# Patient Record
Sex: MALE | Race: WHITE
[De-identification: names, ages, dates, MRNs, and addresses within clinical notes are randomized per-mention and may not be internally consistent; named-entity substitution may affect disease eponyms.]

---

## 2022-08-11 ENCOUNTER — HOSPITAL ENCOUNTER (INPATIENT)
Dept: HOSPITAL 97 - ER | Age: 82
LOS: 13 days | Discharge: NURSING, REHAB OR LONG-TERM CARE | DRG: 69 | End: 2022-08-24
Attending: HOSPITALIST | Admitting: HOSPITALIST
Payer: COMMERCIAL

## 2022-08-11 VITALS — BODY MASS INDEX: 27.3 KG/M2

## 2022-08-11 DIAGNOSIS — Z79.899: ICD-10-CM

## 2022-08-11 DIAGNOSIS — G72.9: ICD-10-CM

## 2022-08-11 DIAGNOSIS — E78.5: ICD-10-CM

## 2022-08-11 DIAGNOSIS — Z20.822: ICD-10-CM

## 2022-08-11 DIAGNOSIS — M62.82: ICD-10-CM

## 2022-08-11 DIAGNOSIS — G93.41: ICD-10-CM

## 2022-08-11 DIAGNOSIS — G45.9: Primary | ICD-10-CM

## 2022-08-11 DIAGNOSIS — G30.9: ICD-10-CM

## 2022-08-11 DIAGNOSIS — Z60.2: ICD-10-CM

## 2022-08-11 DIAGNOSIS — R53.1: ICD-10-CM

## 2022-08-11 DIAGNOSIS — I10: ICD-10-CM

## 2022-08-11 DIAGNOSIS — E88.89: ICD-10-CM

## 2022-08-11 DIAGNOSIS — F02.80: ICD-10-CM

## 2022-08-11 DIAGNOSIS — R09.02: ICD-10-CM
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
## 2022-08-11 LAB
ALBUMIN SERPL BCP-MCNC: 3.3 G/DL (ref 3.4–5)
ALP SERPL-CCNC: 78 U/L (ref 45–117)
ALT SERPL W P-5'-P-CCNC: 18 U/L (ref 12–78)
AST SERPL W P-5'-P-CCNC: 21 U/L (ref 15–37)
BLD SMEAR INTERP: (no result)
BUN BLD-MCNC: 14 MG/DL (ref 7–18)
GLUCOSE SERPLBLD-MCNC: 160 MG/DL (ref 74–106)
HCT VFR BLD CALC: 41.3 % (ref 39.6–49)
INR BLD: 1.16
LYMPHOCYTES # SPEC AUTO: 0.2 K/UL (ref 0.7–4.9)
MCV RBC: 85.9 FL (ref 80–100)
MORPHOLOGY BLD-IMP: (no result)
PMV BLD: 6.2 FL (ref 7.6–11.3)
POTASSIUM SERPL-SCNC: 3.5 MMOL/L (ref 3.5–5.1)
RBC # BLD: 4.81 M/UL (ref 4.33–5.43)

## 2022-08-11 PROCEDURE — 93306 TTE W/DOPPLER COMPLETE: CPT

## 2022-08-11 PROCEDURE — 84100 ASSAY OF PHOSPHORUS: CPT

## 2022-08-11 PROCEDURE — 85652 RBC SED RATE AUTOMATED: CPT

## 2022-08-11 PROCEDURE — 70551 MRI BRAIN STEM W/O DYE: CPT

## 2022-08-11 PROCEDURE — 85025 COMPLETE CBC W/AUTO DIFF WBC: CPT

## 2022-08-11 PROCEDURE — 83605 ASSAY OF LACTIC ACID: CPT

## 2022-08-11 PROCEDURE — 71045 X-RAY EXAM CHEST 1 VIEW: CPT

## 2022-08-11 PROCEDURE — 36415 COLL VENOUS BLD VENIPUNCTURE: CPT

## 2022-08-11 PROCEDURE — 96374 THER/PROPH/DIAG INJ IV PUSH: CPT

## 2022-08-11 PROCEDURE — 87040 BLOOD CULTURE FOR BACTERIA: CPT

## 2022-08-11 PROCEDURE — 83735 ASSAY OF MAGNESIUM: CPT

## 2022-08-11 PROCEDURE — 84443 ASSAY THYROID STIM HORMONE: CPT

## 2022-08-11 PROCEDURE — 80048 BASIC METABOLIC PNL TOTAL CA: CPT

## 2022-08-11 PROCEDURE — 96361 HYDRATE IV INFUSION ADD-ON: CPT

## 2022-08-11 PROCEDURE — 87804 INFLUENZA ASSAY W/OPTIC: CPT

## 2022-08-11 PROCEDURE — 97161 PT EVAL LOW COMPLEX 20 MIN: CPT

## 2022-08-11 PROCEDURE — 80061 LIPID PANEL: CPT

## 2022-08-11 PROCEDURE — 99285 EMERGENCY DEPT VISIT HI MDM: CPT

## 2022-08-11 PROCEDURE — 84132 ASSAY OF SERUM POTASSIUM: CPT

## 2022-08-11 PROCEDURE — 84436 ASSAY OF TOTAL THYROXINE: CPT

## 2022-08-11 PROCEDURE — 70450 CT HEAD/BRAIN W/O DYE: CPT

## 2022-08-11 PROCEDURE — 81015 MICROSCOPIC EXAM OF URINE: CPT

## 2022-08-11 PROCEDURE — 85610 PROTHROMBIN TIME: CPT

## 2022-08-11 PROCEDURE — 86480 TB TEST CELL IMMUN MEASURE: CPT

## 2022-08-11 PROCEDURE — 71260 CT THORAX DX C+: CPT

## 2022-08-11 PROCEDURE — 93005 ELECTROCARDIOGRAM TRACING: CPT

## 2022-08-11 PROCEDURE — 85730 THROMBOPLASTIN TIME PARTIAL: CPT

## 2022-08-11 PROCEDURE — 80053 COMPREHEN METABOLIC PANEL: CPT

## 2022-08-11 PROCEDURE — 82550 ASSAY OF CK (CPK): CPT

## 2022-08-11 PROCEDURE — 97116 GAIT TRAINING THERAPY: CPT

## 2022-08-11 PROCEDURE — 81003 URINALYSIS AUTO W/O SCOPE: CPT

## 2022-08-11 PROCEDURE — 72125 CT NECK SPINE W/O DYE: CPT

## 2022-08-11 RX ADMIN — Medication SCH ML: at 22:58

## 2022-08-11 RX ADMIN — ATORVASTATIN CALCIUM SCH MG: 40 TABLET, FILM COATED ORAL at 22:58

## 2022-08-11 RX ADMIN — ENOXAPARIN SODIUM SCH MG: 40 INJECTION SUBCUTANEOUS at 22:58

## 2022-08-11 RX ADMIN — CEFTRIAXONE SCH MLS: 1 INJECTION, POWDER, FOR SOLUTION INTRAMUSCULAR; INTRAVENOUS at 22:57

## 2022-08-11 SDOH — SOCIAL STABILITY - SOCIAL INSECURITY: PROBLEMS RELATED TO LIVING ALONE: Z60.2

## 2022-08-11 NOTE — ER
Nurse's Notes                                                                                     

 [facility]                                                                 

Name: Levon Rodrigez                                                                              

Age: 82 yrs                                                                                       

Sex: Male                                                                                         

: 1940                                                                                   

MRN: [id number]                                                                                   

Arrival Date: 2022                                                                          

Time: 12:32                                                                                       

Account#: [id number]                                                                            

Bed 6                                                                                             

Private MD:                                                                                       

Diagnosis: Muscle weakness (generalized);Ataxia, unspecified;Viral pneumonitis                    

                                                                                                  

Presentation:                                                                                     

                                                                                             

12:33 Chief complaint: EMS states: patient has been having flu like symptoms for three days   bm7 

      and about an hour ago the son found the patient down on the grown unrsesponsive.            

      Coronavirus screen: Client presents with at least one sign or symptom that may indicate     

      coronavirus-19. Standard/surgical mask placed on the client. Ebola Screen: No symptoms      

      or risks identified at this time. Initial Sepsis Screen: Does the patient meet any 2        

      criteria? HR > 90 bpm. Does the patient have a suspected source of infection? No.           

      Patient's initial sepsis screen is negative. Risk Assessment: Do you want to hurt           

      yourself or someone else? Patient reports no desire to harm self or others. Onset of        

      symptoms was 2022.                                                               

12:33 Method Of Arrival: EMS: [facility]                                                bm7 

12:33 Acuity: JUSTYN 2                                                                           bm7 

                                                                                                  

Triage Assessment:                                                                                

12:35 General: Appears in no apparent distress. comfortable, well groomed, well developed,    bm7 

      Behavior is calm, cooperative. Pain: Denies pain. EENT: No deficits noted. No signs         

      and/or symptoms were reported regarding the EENT system. Neuro: Level of Consciousness      

      is awake, alert, obeys commands, Oriented to person, place, time, situation, Speech         

      slow to respond . Facial symmetry appears normal, Pupils are PERRLA, Reports dizziness,     

      headache. Cardiovascular: Denies shortness of breath, Chest pain is denied.                 

      Respiratory: No deficits noted. Airway is patent Respiratory effort is even, unlabored,     

      Respiratory pattern is regular, symmetrical. GI: No deficits noted. No signs and/or         

      symptoms were reported involving the gastrointestinal system. : No deficits noted. No     

      signs and/or symptoms were reported regarding the genitourinary system. Derm: No            

      deficits noted. No signs and/or symptoms reported regarding the dermatologic system.        

      Musculoskeletal: No deficits noted. No signs and/or symptoms reported regarding the         

      musculoskeletal system.                                                                     

                                                                                                  

Historical:                                                                                       

- Allergies:                                                                                      

12:35 No Known Allergies;                                                                     ha1 

- Home Meds:                                                                                      

12:35 Unable to obtain [Active];                                                              bm7 

- PMHx:                                                                                           

12:35 Hypercholesterolemia; Hypertensive disorder; Dementia; Alzheimer's disease;             ha1 

- PSHx:                                                                                           

12:35 Unable to Obtain;                                                                       bm7 

                                                                                                  

- Immunization history:: Adult Immunizations unknown.                                             

- Social history:: Smoking status: unknown.                                                       

                                                                                                  

                                                                                                  

Screenin:32 Abuse screen: Denies threats or abuse. Nutritional screening: No deficits noted.        ha1 

      Tuberculosis screening: No symptoms or risk factors identified.                             

20:49 Fall Risk Fall in past 12 months (25 points). Secondary diagnosis (15 points) dementia, vc1 

      impaired mobility, IV access (20 points). Ambulatory Aid- Crutches/Cane/Walker (15          

      pts). Gait- Weak (10 pts.). Mental Status- Overestimates/Forgets Limitations (15 pts.).     

      Total Aparicio Fall Scale indicates High Risk Score (45 or more points). Fall prevention       

      measures have been instituted. Side Rails Up X 2 1:1 Attendant Assigned Frequent            

      Obs/Assessments Occuring As available patient and family educated on Fall Prevention        

      Program and Strategies.                                                                     

                                                                                                  

Assessment:                                                                                       

12:35 General: Appears comfortable, Behavior is calm, cooperative. Pain: Denies pain. Neuro:  ha1 

      Level of Consciousness is awake, alert, obeys commands, Oriented to person, place,          

      situation, pt. son's reports that his father was diagnosed with dementia a year ago.        

      pt. son's states " today he seems not like his normal when he walks, he seems weak"..       

12:35 Cardiovascular: Heart tones S1 S2 present Rhythm is sinus rhythm.                       ha1 

12:35 Respiratory: Reports that has had a chronic dry cough. Airway is patent Respiratory     ha1 

      effort is even, unlabored, Respiratory pattern is regular, symmetrical, Breath sounds       

      are clear. GI: No signs and/or symptoms were reported involving the gastrointestinal        

      system. Abdomen is flat, non-distended, Bowel sounds present X 4 quads. : No signs        

      and/or symptoms were reported regarding the genitourinary system. EENT: Reports the pt.     

      son's reports that his father is hard hearing and difficulty seeing long distance..         

      Derm: Skin is intact, Skin is pink, warm \T\ dry. Musculoskeletal: able to ambulate with    

      assistance of cane and assistance of pt.'s son. pt.'s son reports that he noticed a         

      decreased in the ability to walk without assistance.                                        

13:10 Reassessment: Patient and/or family updated on plan of care and expected duration. Pain ha1 

      level reassessed. Patient is alert, oriented x 3, equal unlabored respirations, skin        

      warm/dry/pink.                                                                              

14:50 Reassessment: provider at bedside at this time.                                         tw2 

16:15 Reassessment: Patient and/or family updated on plan of care and expected duration. Pain ha1 

      level reassessed. Patient is alert, oriented x 3, equal unlabored respirations, skin        

      warm/dry/pink.                                                                              

17:10 Reassessment: Patient and/or family updated on plan of care and expected duration. Pain ha1 

      level reassessed. Patient is alert, oriented x 3, equal unlabored respirations, skin        

      warm/dry/pink. pt. attempted to get out of bed and go for a walk outside his room.          

      explained to pt. that he needed to call before getting out bed. assisted pt. to walk        

      few steps inside pt.'s room. pt. back in bed, call bell within reach. room close to         

      nursing station.                                                                            

17:50 Reassessment: Patient and/or family updated on plan of care and expected duration. Pain ha1 

      level reassessed. Patient is alert, oriented x 3, equal unlabored respirations, skin        

      warm/dry/pink. pt. watching TV.                                                             

18:26 Reassessment: Patient and/or family updated on plan of care and expected duration. Pain ha1 

      level reassessed. Patient is alert, oriented x 3, equal unlabored respirations, skin        

      warm/dry/pink. pt.'s son at bedside.                                                        

20:33 Reassessment: Patient and/or family updated on plan of care and expected duration. Pain vc1 

      level reassessed. General: Appears comfortable, Behavior is restless. Neuro: Level of       

      Consciousness is confused, Oriented to person.                                              

                                                                                                  

Vital Signs:                                                                                      

12:33  / 82; Pulse 116; Resp 20; Temp 98.5(TE); Pulse Ox 97% on 2 lpm NC; Weight 81.65  bm7 

      kg (R); Height 5 ft. 8 in. (172.72 cm); Pain 0/10;                                          

14:00  / 81; Pulse 89; Resp 16; Pulse Ox 95% on 2 lpm NC;                               tw2 

14:58  / 86; Pulse 90; Resp 18 S; Pulse Ox 97% on R/A;                                  ha1 

15:30  / 72; Pulse 88; Resp 17 S; Pulse Ox 95% on R/A;                                  ha1 

16:17  / 74; Pulse 92; Resp 16 S; Pulse Ox 96% on R/A;                                  ha1 

17:05  / 72; Pulse 77; Resp 18 S; Pulse Ox 97% on R/A;                                  ha1 

17:49  / 74; Pulse 79; Resp 17; Pulse Ox 96% on R/A;                                    ha1 

18:25  / 76; Pulse 68; Resp 17 S; Pulse Ox 96% on R/A;                                  ha1 

19:00  / 69; Pulse 84; Resp 18; Pulse Ox 96% on R/A;                                    vc1 

20:42  / 81; Pulse 97; Resp 17; Pulse Ox 95% on R/A;                                    vc1 

12:33 Body Mass Index 27.37 (81.65 kg, 172.72 cm)                                             bm7 

                                                                                                  

ED Course:                                                                                        

12:32 Patient arrived in ED.                                                                  aa5 

12:32 Placed in gown. Bed in low position. Call light in reach. Side rails up X2. Adult w/    ha1 

      patient.                                                                                    

12:35 Triage completed.                                                                       bm7 

12:35 Arm band placed on right wrist.                                                         bm7 

12:36 Eric Sheldon DO is Attending Physician.                                                ms3 

12:37 Juanita Montague, YVONNE is Primary Nurse.                                                      ha1 

12:56 Chest Single View XRAY In Process Unspecified.                                          EDMS

12:57 Initial lab(s) drawn, by me, sent to lab. Inserted saline lock: 20 gauge in right       iw  

      forearm, using aseptic technique. Blood collected.                                          

13:14 CT Head C Spine In Process Unspecified.                                                 EDMS

16:33 Tommy Rosales MD is Hospitalizing Provider.                                           ms3 

19:15 Report given to YVONNE Malloy.                                                            ha1 

20:48 No provider procedures requiring assistance completed. Patient admitted, IV remains in  vc1 

      place.                                                                                      

                                                                                                  

Administered Medications:                                                                         

13:43 Drug: NS 0.9% 1000 ml Route: IV; Rate: 1000 ml; Site: right antecubital;                aa5 

15:45 Follow up: Response: No adverse reaction; IV Status: Completed infusion                 ha1 

20:36 Drug: HALdol (haloperidol) 5 mg Route: IVP; Site: right forearm;                        ke1 

                                                                                                  

                                                                                                  

Medication:                                                                                       

20:49 VIS not applicable for this client.                                                     1 

                                                                                                  

Outcome:                                                                                          

16:34 Decision to Hospitalize by Provider.                                                    ms3 

20:48 Admitted to Med/surg accompanied by tech, via wheelchair, room 202, Report called to    vc1 

      YVONNE Enriquez                                                                               

20:48 Condition: good                                                                             

20:48 Instructed on the need for admit, safety practices.                                         

21:16 Patient left the ED.                                                                    1 

                                                                                                  

Signatures:                                                                                       

Dispatcher [employer]                           EDMS                                                 

Iveth George, RN                     YVONNE                                                      

Merlyn Almeida RN                     YVONNE   aa5                                                  

Nohemy Nguyen RN                          YVONNE   tw2                                                  

Eric Sheldon, DO                        DO   ms3                                                  

Anel Valenzuela, RN                  RN   bm7                                                  

Laure Webb RN                    RN   1                                                  

Gama Hernandez RN RN   ke1                                                  

Juanita Montague RN                        RN   ha1                                                  

                                                                                                  

Corrections: (The following items were deleted from the chart)                                    

14:01 14:00  / 81; Pulse 89bpm; Resp 16bpm; Pulse Ox 95% RA; tw2                        tw2 

17:37 12:35 Neuro: Level of Consciousness is awake, alert, obeys commands, Oriented to        ha1 

      person, place, situation, pt. son's reports that his father was diagnosed with dementia     

      a year ago. pt. son's states " today he seems not like his normal when he walks, he         

      seems weak".. ha1                                                                           

17:53 12:35 Respiratory: Airway is patent Respiratory effort is even, unlabored, Respiratory  ha1 

      pattern is regular, symmetrical, Breath sounds are clear ha1                                

19:02 12:35 Allergies: Unable to obtain; bm7                                                  ha1 

                                                                                                  

**************************************************************************************************

## 2022-08-11 NOTE — RAD REPORT
EXAM DESCRIPTION:  RAD - Chest Single View - 8/11/2022 12:54 pm

 

CLINICAL HISTORY:  COUGH

Chest pain.

 

COMPARISON:  No comparisons

 

FINDINGS:  Portable technique limits examination quality.

 

Mildly prominent bilateral interstitial markings likely related to viral pneumonitis. The heart is no
rmal in size. No displaced fractures.

## 2022-08-11 NOTE — EDPHYS
Physician Documentation                                                                           

 [facility]                                                                 

Name: Levon Rodrigez                                                                              

Age: 82 yrs                                                                                       

Sex: Male                                                                                         

: 1940                                                                                   

MRN: [id number]                                                                                   

Arrival Date: 2022                                                                          

Time: 12:32                                                                                       

Account#: [id number]                                                                            

Bed 6                                                                                             

Private MD:                                                                                       

ED Physician Eric Sheldon                                                                         

HPI:                                                                                              

                                                                                             

12:37 This 82 yrs old Male presents to ER via EMS with complaints of Fall, Generalized        ms3 

      weakness, flu like symptoms.                                                                

12:37 Details of fall: The patient fell. Onset: The symptoms/episode began/occurred at an     ms3 

      unknown time. Patient found on the floor at 1130. Associated injuries: The patient          

      sustained no obvious injury. Severity of symptoms: At their worst the symptoms were in      

      the emergency department the symptoms a " 0" out of "10". Severity of symptoms:.            

      82-year-old male with past medical history of dementia presents [facility], EMS          

      status post being found on the ground at 11:30 AM. Patient is typically ambulatory with     

      a cane. EMS states on standing patient was too weak to stand. Patient's son stated to       

      EMS that patient has recently had flulike symptoms. Patient denies pain. HPI limited        

      due to patient's dementia..                                                                 

                                                                                                  

Historical:                                                                                       

- Allergies:                                                                                      

12:35 No Known Allergies;                                                                     ha1 

- Home Meds:                                                                                      

12:35 Unable to obtain [Active];                                                              bm7 

- PMHx:                                                                                           

12:35 Hypercholesterolemia; Hypertensive disorder; Dementia; Alzheimer's disease;             ha1 

- PSHx:                                                                                           

12:35 Unable to Obtain;                                                                       bm7 

                                                                                                  

- Immunization history:: Adult Immunizations unknown.                                             

- Social history:: Smoking status: unknown.                                                       

                                                                                                  

                                                                                                  

ROS:                                                                                              

12:37 Constitutional: Negative for fever, and chills. Cardiovascular: Negative for chest      ms3 

      pain, and palpitations. Respiratory: Negative for shortness of breath, cough, wheezing,     

      and pleuritic chest pain, Abdomen/GI: Negative for abdominal pain, nausea, vomiting,        

      diarrhea, and constipation, Skin: Negative for injury, rash, and discoloration, Neuro:      

      Negative for headache, weakness, numbness, tingling.                                        

12:37 All other systems are negative.                                                             

                                                                                                  

Exam:                                                                                             

12:37 Constitutional:  This is a well developed, well nourished patient who is awake, alert,  ms3 

      and in no acute distress. Head/Face:  Normocephalic, atraumatic. Neck:  Trachea             

      midline, no cervical lymphadenopathy.  Supple, full range of motion without nuchal          

      rigidity, or vertebral point tenderness.  No Meningismus. Chest/axilla:  Normal chest       

      wall appearance and motion.  Nontender with no deformity.   Respiratory:  Lungs have        

      equal breath sounds bilaterally, clear to auscultation and percussion.  No rales,           

      rhonchi or wheezes noted.  No increased work of breathing, no retractions or nasal          

      flaring. Abdomen/GI:  Soft, non-tender, with normal bowel sounds.  No distension or         

      tympany.  No guarding or rebound.  No evidence of tenderness throughout. Skin:  Warm,       

      dry with normal turgor.  Normal color with no rashes, no lesions, and no evidence of        

      cellulitis. MS/ Extremity:  Pulses equal, no cyanosis.  Neurovascular intact.  Full,        

      normal range of motion. Psych:  Awake, alert, with orientation to person, place and         

      time.  Behavior, mood, and affect are within normal limits.                                 

12:37 Cardiovascular: Rate: tachycardic, Rhythm: regular, Pulses: no pulse deficits are           

      appreciated, Heart sounds: normal, normal S1and S2.                                         

13:20 ECG was reviewed by the Attending Physician.                                            ms3 

                                                                                                  

Vital Signs:                                                                                      

12:33  / 82; Pulse 116; Resp 20; Temp 98.5(TE); Pulse Ox 97% on 2 lpm NC; Weight 81.65  bm7 

      kg (R); Height 5 ft. 8 in. (172.72 cm); Pain 0/10;                                          

14:00  / 81; Pulse 89; Resp 16; Pulse Ox 95% on 2 lpm NC;                               tw2 

14:58  / 86; Pulse 90; Resp 18 S; Pulse Ox 97% on R/A;                                  ha1 

15:30  / 72; Pulse 88; Resp 17 S; Pulse Ox 95% on R/A;                                  ha1 

16:17  / 74; Pulse 92; Resp 16 S; Pulse Ox 96% on R/A;                                  ha1 

17:05  / 72; Pulse 77; Resp 18 S; Pulse Ox 97% on R/A;                                  ha1 

17:49  / 74; Pulse 79; Resp 17; Pulse Ox 96% on R/A;                                    ha1 

18:25  / 76; Pulse 68; Resp 17 S; Pulse Ox 96% on R/A;                                  ha1 

19:00  / 69; Pulse 84; Resp 18; Pulse Ox 96% on R/A;                                    vc1 

20:42  / 81; Pulse 97; Resp 17; Pulse Ox 95% on R/A;                                    vc1 

12:33 Body Mass Index 27.37 (81.65 kg, 172.72 cm)                                             bm7 

                                                                                                  

MDM:                                                                                              

12:36 Patient medically screened.                                                             ms3 

12:37 Differential Diagnosis altered mental status, sepsis, flu.                              ms3 

16:34 Data reviewed: vital signs, nurses notes, lab test result(s), EKG, radiologic studies,  ms3 

      and as a result, I will admit patient. Counseling: I had a detailed discussion with the     

      patient and/or guardian regarding: the historical points, exam findings, and any            

      diagnostic results supporting the discharge/admit diagnosis, lab results, radiology         

      results, the need for further work-up and treatment in the hospital. ED course:             

      Discussed case with Dr Rosales and he accepts patient. Discussed plan for observation with     

      patient and his son and they agree with plan. All questions answered. Patients HR           

      improved after fluids..                                                                     

                                                                                                  

                                                                                             

12:42 Order name: Blood Culture Adult (2)                                                     ms3 

                                                                                             

12:42 Order name: CBC with Diff; Complete Time: 05:11                                         ms3 

                                                                                             

12:42 Order name: CMP; Complete Time: 13:33                                                   ms3 

                                                                                             

12:42 Order name: Lactate; Complete Time: 13:33                                               ms3 

                                                                                             

12:42 Order name: Protime (+inr); Complete Time: 13:58                                        ms3 

                                                                                             

12:42 Order name: Ptt, Activated; Complete Time: 13:58                                        ms3 

                                                                                             

12:42 Order name: Urine Microscopic Only; Complete Time: 15:51                                ms3 

                                                                                             

12:49 Order name: SARS-COV-2 RT PCR (Document "Date of Onset" if Symptomatic); Complete Time: iw  

      14:49                                                                                       

                                                                                             

12:49 Order name: Flu; Complete Time: 13:58                                                   iw  

                                                                                             

15:12 Order name: Urine Dipstick-Ancillary; Complete Time: 15:15                              EDMS

                                                                                             

17:33 Order name: CBC with Automated Diff                                                     EDMS

                                                                                             

17:33 Order name: CBC with Automated Diff; Complete Time: 05:11                               EDMS

                                                                                             

17:33 Order name: Comprehensive Metabolic Panel                                               EDMS

                                                                                             

17:33 Order name: Comprehensive Metabolic Panel; Complete Time: 05:11                         EDMS

                                                                                             

12:42 Order name: Chest Single View XRAY; Complete Time: 13:33                                ms3 

                                                                                             

17:33 Order name: Creatine Phosphokinase                                                      EDMS

                                                                                             

17:33 Order name: Creatine Phosphokinase; Complete Time: 05:11                                EDMS

                                                                                             

17:33 Order name: Lipid Profile                                                               EDMS

                                                                                             

17:33 Order name: Lipid Profile; Complete Time: 05:11                                         EDMS

                                                                                             

17:33 Order name: Magnesium                                                                   EDMS

                                                                                             

17:33 Order name: Magnesium; Complete Time: 05:11                                             EDMS

                                                                                             

17:33 Order name: Phosphorus                                                                  EDMS

                                                                                             

17:33 Order name: Phosphorus; Complete Time: 05:11                                            EDMS

                                                                                             

17:33 Order name: Sedimentation Rate, Westergren                                              EDMS

                                                                                             

17:33 Order name: Sedimentation Rate, Westergren; Complete Time: 05:11                        EDMS

                                                                                             

17:33 Order name: T4,Total                                                                    EDMS

                                                                                             

17:33 Order name: T4,Total; Complete Time: 05:11                                              EDMS

                                                                                             

17:33 Order name: Thyroid Stimulating Hormone                                                 EDMS

                                                                                             

17:33 Order name: Thyroid Stimulating Hormone; Complete Time: 05:11                           EDMS

                                                                                             

18:20 Order name: CBC Smear Scan; Complete Time: 05:11                                        EDMS

                                                                                             

12:42 Order name: Cardiac monitoring; Complete Time: 13:08                                    ms3 

                                                                                             

12:42 Order name: EKG - Nurse/Tech; Complete Time: 13:38                                      ms3 

                                                                                             

12:42 Order name: IV Saline Lock - Large Bore; Complete Time: 13:08                           ms3 

                                                                                             

12:42 Order name: Labs collected and sent; Complete Time: 13:08                               ms3 

                                                                                             

12:42 Order name: O2 Per Protocol; Complete Time: 13:08                                       ms3 

                                                                                             

12:42 Order name: O2 Sat Monitoring; Complete Time: 13:08                                     ms3 

                                                                                             

12:42 Order name: CT Head C Spine; Complete Time: 13:33                                       ms3 

                                                                                             

17:33 Order name: Physical Therapy Consult                                                    EDMS

                                                                                             

17:33 Order name: Echo with Doppler                                                           EDMS

                                                                                             

17:33 Order name: Echo with Doppler                                                           EDMS

                                                                                             

17:33 Order name: Brain Wo Cont                                                               EDMS

                                                                                             

17:33 Order name: Brain Wo Cont                                                               EDMS

                                                                                                  

EC:20 Rate is 98 beats/min. Rhythm is regular. QRS Axis is Normal. ND interval is normal.     ms3 

      Clinical impression: NSR w/ Non-specific ST/T Changes. Interpreted by me. Reviewed by       

      me.                                                                                         

                                                                                                  

Administered Medications:                                                                         

13:43 Drug: NS 0.9% 1000 ml Route: IV; Rate: 1000 ml; Site: right antecubital;                aa5 

15:45 Follow up: Response: No adverse reaction; IV Status: Completed infusion                 ha1 

20:36 Drug: HALdol (haloperidol) 5 mg Route: IVP; Site: right forearm;                        ke1 

                                                                                                  

                                                                                                  

Disposition Summary:                                                                              

22 16:34                                                                                    

Hospitalization Ordered                                                                           

      Hospitalization Status: Observation                                                     ms3 

      Provider: Tommy Rosales                                                                ms3 

      Location: Telemetry/MedSurg (observation)                                               ms3 

      Condition: Stable                                                                       ms3 

      Problem: new                                                                            ms3 

      Symptoms: are unchanged                                                                 ms3 

      Bed/Room Type: Standard                                                                 ms3 

      Room Assignment: 202(22 20:30)                                                      

      Diagnosis                                                                                   

        - Muscle weakness (generalized)                                                       ms3 

        - Ataxia, unspecified                                                                 ms3 

        - Viral pneumonitis                                                                   ms3 

      Forms:                                                                                      

        - Medication Reconciliation Form                                                      ms3 

        - SBAR form                                                                           ms3 

Signatures:                                                                                       

Dispatcher [employer]                           EDMS                                                 

Jolanta Alaniz RN RN mw Calderon, Audri RN                     RN   aa5                                                  

Eric Sheldon DO                        DO   ms3                                                  

Anel Valenzuela RN                  RN   bm7                                                  

Gama Hernandez RN                   RN   [id number]                                                  

Patricia Damon PA                       PA   sb3                                                  

Juanita Montague RN                        RN   ha1                                                  

                                                                                                  

Corrections: (The following items were deleted from the chart)                                    

13:39 12:42 Accucheck ordered. ms3                                                            aa5 

19:02 12:35 Allergies: Unable to obtain; bm7                                                  ha1 

20:30 16:34 ms3                                                                               mw  

                                                                                                  

**************************************************************************************************

## 2022-08-11 NOTE — RAD REPORT
EXAM DESCRIPTION:  CT - [id number] - 8/11/2022 1:13 pm

 

CLINICAL HISTORY:  Trauma, head and neck injury.

fall

 

COMPARISON:  No comparisons

 

TECHNIQUE:  Axial 5 mm thick images of the head were obtained.

 

Axial 2 mm thick images of the cervical spine were obtained with sagittal and coronal reconstruction 
images generated and reviewed.

 

All CT scans are performed using dose optimization technique as appropriate and may include automated
 exposure control or mA/KV adjustment according to patient size.

 

FINDINGS:  CT HEAD WITHOUT CONTRAST:

 

No acute hemorrhage, hydrocephalus or extra-axial collection is identified.Moderate diffuse brain atr
ophy.No areas of brain edema or midline shift.

 

Mild fluid is seen in both maxillary antra as well as the ethmoid air cells. Trace fluid also seen in
 both mastoid air cells.The calvarium is intact.

 

CT CERVICAL SPINE WITHOUT CONTRAST:

 

No fracture or subluxation.Mild cervical degenerative changes.No prevertebral soft tissues swelling i
s identified.

 

IMPRESSION:  No acute intracranial or cervical spine findings.

## 2022-08-11 NOTE — P.HP
Certification for Inpatient


Patient admitted to: Inpatient


With expected LOS: >2 Midnights


Patient will require the following post-hospital care: Skilled Nursing


Practitioner: I am a practitioner with admitting privileges, knowledge of 

patient current condition, hospital course, and medical plan of care.


Services: Services provided to patient in accordance with Admission requirements

found in Title 42 Section 412.3 of the Code of Federal Regulations





Patient History


Date of Service: 08/11/22


Reason for admission: Generalized weakness; status post fall; altered mental 

status;


History of Present Illness: 





Patient is an 82-year-old gentleman came to the hospital after falling.  Patient

apparently has been weak and not really ambulating well.  Patient has a history 

of dementia but he is able to live by himself with assistance of his son.  He 

does his own ADLs.  He is forgetful and he has been diagnosed with dementia.  

But as of late he has been falling and been really weak.  He was found on the 

floor and his son brought him into the emergency room.  In the emergency room 

patient is confused and lethargic and not making much sense.  There is concern 

he has had a CVA.  CT imaging is negative.  Were not really sure how long he was

down so he is out of the window for tPA.  He will be admitted to the hospital 

for further evaluation.  Patient's son also states has been coughing and 

congested. He has not had any fevers.  He has not had any fevers.  He will be 

admitted to the hospital for further evaluation.


Allergies





No Known Allergies Allergy (Unverified 08/11/22 20:46)


   





Home Medications: 








Losartan Potassium [Cozaar] 1 tab PO DAILY 08/12/22 


Simvastatin 1 tab PO BEDTIME 08/12/22 








- Past Medical/Surgical History


-: Dementia


Past Surgical History: Unable to obtain





- Family History


  ** Father


History Unknown: Yes





  ** Mother


History Unknown: Yes





- Social History


Smoking Status: Never smoker


Alcohol use: No


CD- Drugs: No





Review of Systems


10-point ROS is otherwise unremarkable





Physical Examination





- Vital Signs


Temperature: 100 F


Blood Pressure: 109/60


Pulse: 88


Respirations: 18


Pulse Ox (%): 85





- Physical Exam


General: Demented, Confused, Other (Lethargic)


HEENT: Atraumatic, PERRLA, Mucous membr. moist/pink, EOMI, Sclerae nonicteric


Neck: Supple, 2+ carotid pulse no bruit, No LAD, Without JVD or thyroid 

abnormality


Respiratory: Clear to auscultation bilaterally, Normal air movement


Cardiovascular: Regular rate/rhythm, Normal S1 S2, No murmurs


Gastrointestinal: Normal bowel sounds, Soft and benign, Non-distended, No 

tenderness, No rebound, No guarding


Musculoskeletal: No clubbing, No swelling, No tenderness


Integumentary: No rashes


Neurological: Normal speech, Sensation intact, Cranial nerves 3-12 intact, 

Abnormal gait, Abnormal strength


Lymphatics: No axilla or inguinal lymphadenopathy





- Studies


Laboratory Data (last 24 hrs)





08/11/22 12:55: PT 12.8 H, INR 1.16, APTT 31.5


08/11/22 12:55: Sodium 133 L, Potassium 3.5, BUN 14, Creatinine 1.13, Glucose 

160 H, Total Bilirubin 0.6, AST 21, ALT 18, Alkaline Phosphatase 78


08/11/22 12:55: WBC 11.2 H, Hgb 14.3, Hct 41.3, Plt Count 207





Microbiology Data (last 24 hrs): 








08/11/22 13:00   Nasopharnyx   Influenza Type A Antigen Screen - Final


08/11/22 13:00   Nasopharnyx   Influenza Type B Antigen Screen - Final








Assessment & Plan





- Problems (Diagnosis)


(1) Altered mental status


Current Visit: Yes   Status: Acute   





(2) Fever


Current Visit: Yes   Status: Acute   





(3) Status post fall


Current Visit: Yes   Status: Acute   





(4) Generalized weakness


Current Visit: Yes   Status: Acute   





(5) Myopathy


Current Visit: Yes   Status: Acute   





(6) Alzheimer's dementia


Current Visit: Yes   Status: Acute   





(7) TIA (transient ischemic attack)


Current Visit: Yes   Status: Acute   





- Plan





PLAN:


1.  MRI of the brain


2.  Antiplatelet and statin therapy


3.  Lipid profile


4.  Physical therapy and speech therapy consultation


5.  DVT prophylaxis


6.  Neurochecks every 4 hours


7.  Reassess stroke scale


8.  CPK is pending


9.  GI and DVT prophylaxis


Discharge Plan: Home


Plan to discharge in: Greater than 2 days





- Advance Directives


Does patient have a Living Will: No


Does patient have a Durable POA for Healthcare: No





- Code Status/Comfort Care


Code Status Assessed: Yes


Code Status: Full Code


Critical Care: No


Time Spent Managing PTS Care (In Minutes): 45

## 2022-08-12 LAB
ALBUMIN SERPL BCP-MCNC: 2.7 G/DL (ref 3.4–5)
ALP SERPL-CCNC: 69 U/L (ref 45–117)
ALT SERPL W P-5'-P-CCNC: 25 U/L (ref 12–78)
AST SERPL W P-5'-P-CCNC: 70 U/L (ref 15–37)
BUN BLD-MCNC: 13 MG/DL (ref 7–18)
GLUCOSE SERPLBLD-MCNC: 107 MG/DL (ref 74–106)
HCT VFR BLD CALC: 38.5 % (ref 39.6–49)
HDLC SERPL-MCNC: 43 MG/DL (ref 40–60)
LDLC SERPL CALC-MCNC: 66 MG/DL (ref ?–130)
LYMPHOCYTES # SPEC AUTO: 0.7 K/UL (ref 0.7–4.9)
MAGNESIUM SERPL-MCNC: 1.9 MG/DL (ref 1.8–2.4)
MCV RBC: 87.3 FL (ref 80–100)
PMV BLD: 6.4 FL (ref 7.6–11.3)
POTASSIUM SERPL-SCNC: 3.2 MMOL/L (ref 3.5–5.1)
RBC # BLD: 4.41 M/UL (ref 4.33–5.43)
T4 SERPL-MCNC: 6.5 UG/DL (ref 4.5–12.1)
TSH SERPL DL<=0.05 MIU/L-ACNC: 1.85 UIU/ML (ref 0.36–3.74)

## 2022-08-12 RX ADMIN — CEFTRIAXONE SCH MLS: 1 INJECTION, POWDER, FOR SOLUTION INTRAMUSCULAR; INTRAVENOUS at 09:20

## 2022-08-12 RX ADMIN — ENOXAPARIN SODIUM SCH MG: 40 INJECTION SUBCUTANEOUS at 09:20

## 2022-08-12 RX ADMIN — SODIUM CHLORIDE SCH: 0.9 INJECTION, SOLUTION INTRAVENOUS at 14:00

## 2022-08-12 RX ADMIN — CLOPIDOGREL BISULFATE SCH MG: 75 TABLET, FILM COATED ORAL at 09:21

## 2022-08-12 RX ADMIN — Medication SCH ML: at 09:19

## 2022-08-12 RX ADMIN — ACETAMINOPHEN PRN MG: 500 TABLET, FILM COATED ORAL at 20:19

## 2022-08-12 RX ADMIN — ATORVASTATIN CALCIUM SCH MG: 40 TABLET, FILM COATED ORAL at 20:20

## 2022-08-12 RX ADMIN — Medication SCH ML: at 20:21

## 2022-08-12 RX ADMIN — CEFTRIAXONE SCH MLS: 1 INJECTION, POWDER, FOR SOLUTION INTRAMUSCULAR; INTRAVENOUS at 20:20

## 2022-08-12 RX ADMIN — ASPIRIN SCH MG: 81 TABLET, COATED ORAL at 09:21

## 2022-08-12 RX ADMIN — SODIUM CHLORIDE SCH MLS: 0.9 INJECTION, SOLUTION INTRAVENOUS at 09:20

## 2022-08-12 NOTE — RAD REPORT
EXAM DESCRIPTION:  MRI - Brain Wo Cont - 8/12/2022 12:20 pm

 

CLINICAL HISTORY:  AMS

Headache, drowsiness

 

COMPARISON:  Head C Spine Mpr Wo Con dated 8/11/2022

 

TECHNIQUE:  Multi-sequence, multiplanar MR imaging of the brain was performed without contrast.

 

FINDINGS:  No intracranial hemorrhage, hydrocephalus or extra-axial fluid collections.Moderate diffus
e brain atrophy. No edema or shift of midline structures. No findings to suspect brain mass. DWI is n
egative for acute CVA.

 

Midline structures are normally formed.

 

Bilateral mastoid effusions. Mild mucoperiosteal thickening of both maxillary antra.

 

IMPRESSION:  No acute infarct or other acute intracranial finding.

 

Bilateral mastoid effusions.

## 2022-08-12 NOTE — P.PN
Subjective


Date of Service: 08/12/22





Generalized weakness; status post fall; altered mental status; Pt with hypoxemia

today. change to inpt; CPK was greater than 2000





Review of Systems


10-point ROS is otherwise unremarkable





Physical Examination





- Vital Signs


Temperature: 98 F


Blood Pressure: 140/80


Pulse: 70


Respirations: 18


Pulse Ox (%): 96





- Physical Exam


General: Alert, In no apparent distress, Demented


HEENT: Atraumatic, PERRLA, EOMI


Neck: Supple, JVD not distended


Respiratory: Clear to auscultation bilaterally, Normal air movement


Cardiovascular: Regular rate/rhythm, Normal S1 S2


Gastrointestinal: Normal bowel sounds, No tenderness


Musculoskeletal: No tenderness


Integumentary: No rashes


Neurological: Normal speech, Normal tone, Normal affect


Lymphatics: No axilla or inguinal lymphadenopathy





- Studies


Laboratory Data (last 24 hrs)





08/11/22 12:55: WBC 11.2 H, Hgb 14.3, Hct 41.3, Plt Count 207





Microbiology Data (last 24 hrs): 








08/11/22 13:00   Nasopharnyx   Influenza Type A Antigen Screen - Final


08/11/22 13:00   Nasopharnyx   Influenza Type B Antigen Screen - Final





Medications List Reviewed: Yes





Assessment & Plan





- Problems (Diagnosis)


(1) Altered mental status


Current Visit: Yes   Status: Acute   





(2) Fever


Current Visit: Yes   Status: Acute   





(3) Status post fall


Current Visit: Yes   Status: Acute   





(4) Generalized weakness


Current Visit: Yes   Status: Acute   





(5) Myopathy


Current Visit: Yes   Status: Acute   





(6) Alzheimer's dementia


Current Visit: Yes   Status: Acute   





(7) TIA (transient ischemic attack)


Current Visit: Yes   Status: Acute   





(8) Hypoxemia


Current Visit: Yes   Status: Acute   





- Plan





PLAN:


1.  MRI of the brain did not reveal an acute infarct


2.  Hold statin therapy pending CPK levels


3.  Lipid profile are stable


4.  Physical therapy and speech therapy consultation are appreciated


5.  DVT prophylaxis


6.  Neurochecks every 4 hours


7.  Work on skilled nursing facility placement


8.  CPK is pending


9.  GI and DVT prophylaxis


Discharge Plan: Nursing Home


Plan to discharge in: 48 Hours





- Advance Directives


Does patient have a Living Will: No


Does patient have a Durable POA for Healthcare: No





- Code Status/Comfort Care


Code Status: Full Code


Critical Care: No


Time Spent Managing PTS Care (In Minutes): 45

## 2022-08-13 LAB
BUN BLD-MCNC: 11 MG/DL (ref 7–18)
GLUCOSE SERPLBLD-MCNC: 94 MG/DL (ref 74–106)
POTASSIUM SERPL-SCNC: 3.3 MMOL/L (ref 3.5–5.1)

## 2022-08-13 RX ADMIN — Medication SCH ML: at 19:52

## 2022-08-13 RX ADMIN — SODIUM CHLORIDE SCH: 0.9 INJECTION, SOLUTION INTRAVENOUS at 13:06

## 2022-08-13 RX ADMIN — ATORVASTATIN CALCIUM SCH MG: 40 TABLET, FILM COATED ORAL at 19:51

## 2022-08-13 RX ADMIN — Medication PRN MG: at 19:51

## 2022-08-13 RX ADMIN — HALOPERIDOL LACTATE ONE MG: 5 INJECTION, SOLUTION INTRAMUSCULAR at 21:20

## 2022-08-13 RX ADMIN — CEFTRIAXONE SCH MLS: 1 INJECTION, POWDER, FOR SOLUTION INTRAMUSCULAR; INTRAVENOUS at 08:44

## 2022-08-13 RX ADMIN — HALOPERIDOL LACTATE ONE: 5 INJECTION, SOLUTION INTRAMUSCULAR at 21:20

## 2022-08-13 RX ADMIN — ACETAMINOPHEN PRN MG: 500 TABLET, FILM COATED ORAL at 19:51

## 2022-08-13 RX ADMIN — CEFTRIAXONE SCH MLS: 1 INJECTION, POWDER, FOR SOLUTION INTRAMUSCULAR; INTRAVENOUS at 19:50

## 2022-08-13 RX ADMIN — ASPIRIN SCH MG: 81 TABLET, COATED ORAL at 08:45

## 2022-08-13 RX ADMIN — Medication SCH ML: at 08:45

## 2022-08-13 RX ADMIN — SODIUM CHLORIDE SCH: 0.9 INJECTION, SOLUTION INTRAVENOUS at 06:00

## 2022-08-13 RX ADMIN — CLOPIDOGREL BISULFATE SCH MG: 75 TABLET, FILM COATED ORAL at 08:43

## 2022-08-13 RX ADMIN — SODIUM CHLORIDE SCH MLS: 0.9 INJECTION, SOLUTION INTRAVENOUS at 19:50

## 2022-08-13 RX ADMIN — ENOXAPARIN SODIUM SCH MG: 40 INJECTION SUBCUTANEOUS at 08:44

## 2022-08-13 RX ADMIN — SODIUM CHLORIDE SCH MLS: 0.9 INJECTION, SOLUTION INTRAVENOUS at 02:02

## 2022-08-14 LAB
BUN BLD-MCNC: 9 MG/DL (ref 7–18)
GLUCOSE SERPLBLD-MCNC: 86 MG/DL (ref 74–106)
HCT VFR BLD CALC: 35.3 % (ref 39.6–49)
LYMPHOCYTES # SPEC AUTO: 1.1 K/UL (ref 0.7–4.9)
MCV RBC: 88.7 FL (ref 80–100)
PMV BLD: 6.7 FL (ref 7.6–11.3)
POTASSIUM SERPL-SCNC: 3.2 MMOL/L (ref 3.5–5.1)
RBC # BLD: 3.98 M/UL (ref 4.33–5.43)

## 2022-08-14 RX ADMIN — SODIUM CHLORIDE SCH MLS: 0.9 INJECTION, SOLUTION INTRAVENOUS at 04:40

## 2022-08-14 RX ADMIN — SODIUM CHLORIDE SCH MLS: 0.9 INJECTION, SOLUTION INTRAVENOUS at 22:00

## 2022-08-14 RX ADMIN — CEFTRIAXONE SCH MLS: 1 INJECTION, POWDER, FOR SOLUTION INTRAMUSCULAR; INTRAVENOUS at 21:47

## 2022-08-14 RX ADMIN — Medication SCH: at 09:00

## 2022-08-14 RX ADMIN — CLOPIDOGREL BISULFATE SCH MG: 75 TABLET, FILM COATED ORAL at 11:25

## 2022-08-14 RX ADMIN — ASPIRIN SCH MG: 81 TABLET, COATED ORAL at 11:25

## 2022-08-14 RX ADMIN — SODIUM CHLORIDE SCH MLS: 0.9 INJECTION, SOLUTION INTRAVENOUS at 14:49

## 2022-08-14 RX ADMIN — ENOXAPARIN SODIUM SCH MG: 40 INJECTION SUBCUTANEOUS at 11:24

## 2022-08-14 RX ADMIN — CEFTRIAXONE SCH MLS: 1 INJECTION, POWDER, FOR SOLUTION INTRAMUSCULAR; INTRAVENOUS at 11:25

## 2022-08-14 RX ADMIN — Medication SCH ML: at 21:00

## 2022-08-14 RX ADMIN — ATORVASTATIN CALCIUM SCH MG: 40 TABLET, FILM COATED ORAL at 21:48

## 2022-08-14 RX ADMIN — Medication PRN MG: at 21:48

## 2022-08-14 NOTE — P.PN
Date of Service: 08/13/22





Subjective


Patient is doing better.  CPK level has improved.  Patient still confused but 

strength has improved.  Continue working on placement at this time





Review of Systems


10-point ROS is otherwise unremarkable





Physical Examination





- Vital Signs


Reviewed





- Physical Exam


General: Alert, In no apparent distress, Demented


Respiratory: Clear to auscultation bilaterally, Normal air movement


Cardiovascular: Regular rate/rhythm, Normal S1 S2


Gastrointestinal: Normal bowel sounds, No tenderness


Neurological: Normal speech, Normal tone, Normal affect





Assessment & Plan





- Problems (Diagnosis)


(1) Altered mental status


Current Visit: Yes   Status: Acute   





(2) Fever


Current Visit: Yes   Status: Acute   





(3) Status post fall


Current Visit: Yes   Status: Acute   





(4) Generalized weakness


Current Visit: Yes   Status: Acute   





(5) Myopathy


Current Visit: Yes   Status: Acute   





(6) Alzheimer's dementia


Current Visit: Yes   Status: Acute   





(7) TIA (transient ischemic attack)


Current Visit: Yes   Status: Acute   





(8) Hypoxemia


Current Visit: Yes   Status: Acute   





- Plan


Continue with plan of care as mentioned below:


1.  MRI of the brain did not reveal an acute infarct


2.  Hold statin therapy pending CPK levels are improving.  May resume and 

recheck later


3.  Lipid profile are stable; LDL in the 60s


4.  Physical therapy and speech therapy consultation are appreciated; working on

SNF placement


5.  DVT prophylaxis


6.  Neurochecks every 4 hours


7.  Work on skilled nursing facility placement


8.  CPK is trending downward.;  Hep-Lock IV in a.m.


9.  GI and DVT prophylaxis





Discharge Plan: Nursing Home


Plan to discharge in: 48 Hours





- Advance Directives


Does patient have a Living Will: No


Does patient have a Durable POA for Healthcare: No





- Code Status/Comfort Care


Code Status: Full Code


Critical Care: No


Time Spent Managing PTS Care (In Minutes): 45

## 2022-08-14 NOTE — P.PN
Date of Service: 08/14/22





Subjective


Pt is doing better; no new complaints.  Continues to improve.  Working with 

physical therapy.  Transfer to skilled nursing when accepted





Review of Systems


10-point ROS is otherwise unremarkable





Physical Examination





- Vital Signs


Reviewed





- Physical Exam


General: Alert, In no apparent distress, Demented


Respiratory: Clear to auscultation bilaterally, Normal air movement


Cardiovascular: Regular rate/rhythm, Normal S1 S2


Gastrointestinal: Normal bowel sounds, No tenderness


Neurological: Normal speech, Normal tone, Normal affect





Assessment & Plan





- Problems (Diagnosis)


(1) Altered mental status


Current Visit: Yes   Status: Acute   





(2) Fever


Current Visit: Yes   Status: Acute   





(3) Status post fall


Current Visit: Yes   Status: Acute   





(4) Generalized weakness


Current Visit: Yes   Status: Acute   





(5) Myopathy


Current Visit: Yes   Status: Acute   





(6) Alzheimer's dementia


Current Visit: Yes   Status: Acute   





(7) TIA (transient ischemic attack)


Current Visit: Yes   Status: Acute   





(8) Hypoxemia


Current Visit: Yes   Status: Acute   





- Plan


Continue with plan of care as mentioned below:


1.  MRI of the brain did not reveal an acute infarct; patient with advanced 

dementia


2.  Hold statin therapy pending CPK levels are improving.  Rhabdo pretty much 

resolved.  Hep-Lock IV.  Repeat levels in the morning


3.  Lipid profile are stable; LDL in the 60s


4.  Physical therapy and speech therapy consultation are appreciated; working on

SNF placement


5.  DVT prophylaxis


6.  Monitor hemodynamics; hold losartan


7.  Work on skilled nursing facility placement


8.  CPK is trending downward.;  Hep-Lock IV


9.  GI and DVT prophylaxis





Discharge Plan: Nursing Home


Plan to discharge in: 48 Hours





- Advance Directives


Does patient have a Living Will: No


Does patient have a Durable POA for Healthcare: No





- Code Status/Comfort Care


Code Status: Full Code


Critical Care: No


Time Spent Managing PTS Care (In Minutes): 45

## 2022-08-15 LAB
BUN BLD-MCNC: 11 MG/DL (ref 7–18)
GLUCOSE SERPLBLD-MCNC: 94 MG/DL (ref 74–106)
MAGNESIUM SERPL-MCNC: 1.8 MG/DL (ref 1.8–2.4)
POTASSIUM SERPL-SCNC: 3.2 MMOL/L (ref 3.5–5.1)

## 2022-08-15 RX ADMIN — ACETAMINOPHEN PRN MG: 500 TABLET, FILM COATED ORAL at 09:42

## 2022-08-15 RX ADMIN — Medication SCH: at 21:00

## 2022-08-15 RX ADMIN — CEFTRIAXONE SCH MLS: 1 INJECTION, POWDER, FOR SOLUTION INTRAMUSCULAR; INTRAVENOUS at 22:01

## 2022-08-15 RX ADMIN — SODIUM CHLORIDE SCH MLS: 0.9 INJECTION, SOLUTION INTRAVENOUS at 09:36

## 2022-08-15 RX ADMIN — SODIUM CHLORIDE SCH: 0.9 INJECTION, SOLUTION INTRAVENOUS at 14:00

## 2022-08-15 RX ADMIN — ASPIRIN SCH MG: 81 TABLET, COATED ORAL at 09:34

## 2022-08-15 RX ADMIN — ATORVASTATIN CALCIUM SCH MG: 40 TABLET, FILM COATED ORAL at 22:02

## 2022-08-15 RX ADMIN — CLOPIDOGREL BISULFATE SCH MG: 75 TABLET, FILM COATED ORAL at 09:34

## 2022-08-15 RX ADMIN — Medication SCH: at 09:00

## 2022-08-15 RX ADMIN — ENOXAPARIN SODIUM SCH MG: 40 INJECTION SUBCUTANEOUS at 09:34

## 2022-08-15 RX ADMIN — CEFTRIAXONE SCH MLS: 1 INJECTION, POWDER, FOR SOLUTION INTRAMUSCULAR; INTRAVENOUS at 09:34

## 2022-08-15 NOTE — ECHO
HEIGHT: 5 ft 8 in   WEIGHT: 180 lb 0 oz   DATE OF STUDY: 08/12/2022   REFER DR: Tommy Rosales MD

2-DIMENSIONAL: YES

     M.MODE: YES

 DOPPLER: YES

COLOR FLOW: YES



                    TDS:  NO

PORTABLE: YES

 DEFINITY:  NO

BUBBLE STUDY: YES





DIAGNOSIS:  TRANSIENT ISCHEMIC ATTACK



CARDIAC HISTORY:  

CATHERIZATION: 

SURGERY: 

PROSTHETIC VALVE: 

PACEMAKER: 





MEASUREMENTS (cm)

    DIASTOLIC (NORMALS)                 SYSTOLIC (NORMALS)

IVSd                 1.0 (0.6-1.2)                    LA Diam 3.0 (1.9-4.0)     LVEF       
  64%  

LVIDd               4.7 (3.5-5.7)                        LVIDs      3.1 (2.0-3.5)     %FS  
        35%

LVPWd             1.1 (0.6-1.2)

Ao Diam          3.1 (2.0-3.7)



2 DIMENSIONAL ASSESSMENT:

RIGHT ATRIUM:                   NORMAL

LEFT ATRIUM:       NORMAL



RIGHT VENTRICLE:            NORMAL

LEFT VENTRICLE: NORMAL



TRICUSPID VALVE:             

MITRAL VALVE:     



PULMONIC VALVE:             NORMAL

AORTIC VALVE:     



PERICARDIAL EFFUSION: NONE

AORTIC ROOT:      NORMAL





LEFT VENTRICULAR WALL MOTION:     NORMAL



DOPPLER/COLOR FLOW:     SEE BELOW



COMMENTS:      NORMAL LEFT VENTRICULAR EJECTION FRACTION 55-60% WITH NORMAL WALL MOTION. 
MILD MITRAL AND TRICUSPID REGURGITATION. MILD TO MODERATE AORTIC REGURGITATION. 

BUBBLE STUDY WAS NEGATIVE FOR INTRACARDIAC SHUNT.



TECHNOLOGIST:   NICKY KILLIAN

## 2022-08-15 NOTE — P.PN
Subjective


Date of Service: 08/15/22


Chief Complaint: Generalized weakness; status post fall; altered mental status;


Subjective: No new changes


No acute events overnight. He is alert and oriented x 2 to self and location. He

does not know the date. He reports no concerns this morning.





Review of Systems


10-point ROS is otherwise unremarkable


Neurological: Other (advanced dementia)





Physical Examination





- Vital Signs


Temperature: 97.6 F


Blood Pressure: 177/88


Pulse: 65


Respirations: 14


Pulse Ox (%): 98





- Physical Exam


General: Alert, In no apparent distress, Oriented x2


HEENT: Atraumatic, PERRLA, Mucous membr. moist/pink, EOMI, Sclerae nonicteric


Neck: Supple, JVD not distended


Respiratory: Clear to auscultation bilaterally, Normal air movement


Cardiovascular: No edema, Regular rate/rhythm, Normal S1 S2, No gallops, No 

rubs, No murmurs


Gastrointestinal: Normal bowel sounds, Soft and benign, Non-distended, No 

tenderness, No rebound, No guarding


Musculoskeletal: No clubbing


Integumentary: No rashes


Neurological: Normal speech, Cranial nerves 3-12 intact, Normal affect





- Studies


Medications List Reviewed: Yes





Assessment And Plan





- Plan


# Acute Metabolic Encephalopathy (resolved)


# Generalized Weakness complicated by Recurrent Falls


# Rhabdomyolosis


# Starvation Ketosis


# Advanced Alzheimer's Dementia


# Transient Ischemic Attack


- Statin on hold given rhabdo


- Medically cleared for discharge once accepted to a locked memory care unit


   - Appreciate case management assistance 


- Continue PT/OT


- VTE prophalyxis








Ruben Washburn M.D.








Discharge Plan: Transfer


Plan to discharge in: Unknown

## 2022-08-16 RX ADMIN — CEFTRIAXONE SCH MLS: 1 INJECTION, POWDER, FOR SOLUTION INTRAMUSCULAR; INTRAVENOUS at 21:29

## 2022-08-16 RX ADMIN — ENOXAPARIN SODIUM SCH MG: 40 INJECTION SUBCUTANEOUS at 09:41

## 2022-08-16 RX ADMIN — Medication PRN MG: at 21:28

## 2022-08-16 RX ADMIN — Medication SCH: at 09:00

## 2022-08-16 RX ADMIN — ACETAMINOPHEN PRN MG: 500 TABLET, FILM COATED ORAL at 09:40

## 2022-08-16 RX ADMIN — CEFTRIAXONE SCH MLS: 1 INJECTION, POWDER, FOR SOLUTION INTRAMUSCULAR; INTRAVENOUS at 09:41

## 2022-08-16 RX ADMIN — ASPIRIN SCH MG: 81 TABLET, COATED ORAL at 09:41

## 2022-08-16 RX ADMIN — ACETAMINOPHEN PRN MG: 500 TABLET, FILM COATED ORAL at 21:28

## 2022-08-16 RX ADMIN — CLOPIDOGREL BISULFATE SCH MG: 75 TABLET, FILM COATED ORAL at 09:42

## 2022-08-16 RX ADMIN — Medication SCH ML: at 21:29

## 2022-08-16 RX ADMIN — ATORVASTATIN CALCIUM SCH MG: 40 TABLET, FILM COATED ORAL at 21:28

## 2022-08-16 NOTE — P.PN
Subjective


Date of Service: 08/16/22


Chief Complaint: Generalized weakness; status post fall; altered mental status;


No acute events overnight. He is alert and oriented x 1 to self. He does not 

know the date or his location. He reports no concerns this morning.





Review of Systems


is unable to be obtained





Physical Examination





- Vital Signs


Temperature: 98.6 F


Blood Pressure: 164/87


Pulse: 86


Respirations: 16


Pulse Ox (%): 98





- Studies


Microbiology Data (last 24 hrs): 








08/11/22 13:40   Blood  - Blood   Aerobic Blood Culture - Final


                            No growth in 5 days.


08/11/22 13:40   Blood  - Blood   Anaerobic Blood Culture - Final


                            No growth in 5 days.


08/11/22 13:08   Blood  - Blood   Aerobic Blood Culture - Final


                            No growth in 5 days.


08/11/22 13:08   Blood  - Blood   Anaerobic Blood Culture - Final


                            No growth in 5 days.





Medications List Reviewed: Yes





Assessment And Plan





- Plan


- Physical Exam


General: Alert, In no apparent distress, Oriented x1 to self


HEENT: Atraumatic, PERRLA, Mucous membr. moist/pink, EOMI, Sclerae nonicteric


Neck: Supple, JVD not distended


Respiratory: Clear to auscultation bilaterally, Normal air movement


Cardiovascular: No edema, Regular rate/rhythm, Normal S1 S2, No gallops, No 

rubs, No murmurs


Gastrointestinal: Normal bowel sounds, Soft and benign, Non-distended, No 

tenderness, No rebound, No guarding


Musculoskeletal: No clubbing


Integumentary: No rashes


Neurological: Normal speech, Cranial nerves 3-12 intact, Normal affect











# Acute Metabolic Encephalopathy (resolved)


# Generalized Weakness complicated by Recurrent Falls


# Rhabdomyolosis


# Starvation Ketosis


# Advanced Alzheimer's Dementia


# Transient Ischemic Attack


- Statin on hold given rhabdo


- Medically cleared for discharge once accepted to a locked memory care unit


   - Appreciate case management assistance 


- Continue PT/OT


- VTE prophalyxis








Ruben Washburn M.D.








Discharge Plan: Transfer (Memory Care Unit)

## 2022-08-16 NOTE — EKG
Test Date:    2022-08-11               Test Time:    13:20:54

Technician:   AMANDO                                    

                                                     

MEASUREMENT RESULTS:                                       

Intervals:                                           

Rate:         98                                     

PA:           152                                    

QRSD:         80                                     

QT:           330                                    

QTc:          421                                    

Axis:                                                

P:            67                                     

PA:           152                                    

QRS:          -12                                    

T:            69                                     

                                                     

INTERPRETIVE STATEMENTS:                                       

                                                     

Normal sinus rhythm

Low voltage QRS

Possible Inferior infarct, age undetermined

Abnormal ECG

No previous ECG available for comparison



Electronically Signed On 08-16-22 08:13:23 CDT by Ez Galdamez

## 2022-08-16 NOTE — CON
Consultation called because of altered mental status.



History Of Present Illness:  Mr. Rodrigez is an 82-year-old right-handed  patient with history
 of dementia, hypertension, dyslipidemia, who was at home apparently with his son when he was found d
own on the floor, poorly responsive.  He did appear confused and was disoriented.  He came to [facility] and since the time of his event could not be established, he was never a candidate for a
ny acute antithrombotic treatment.  His trauma series, CT scan did not show any fractures.  CT imagin
g of the head was negative for any acute ischemic hemorrhagic stroke.  Further, subsequent brain MRI,
 stroke protocol was negative for any acute ischemic or hemorrhagic stroke.  His blood work did revea
l elevated creatine kinase up to 2501, perhaps related to his fall.  His electrolytes did show low po
tassium, which was corrected and blood sugars were not very abnormal.  His lactic acid level was norm
al.  Liver function studies essentially unremarkable and his complete blood count with differential a
lso is essentially unremarkable, although when he initially came in, white blood cell count was 11.2 
with 93% neutrophils.  Just over the next 2 days, all of those numbers normalized to now a white coun
t of 6, with neutrophils 71.5%.  Urinalysis showed 2+ ketones, 2+ blood, 2+ protein.  COVID-19 testin
g was negative.



Past Medical History:  As noted.



Allergies:  NO KNOWN DRUG ALLERGIES.



Medications:  At home, losartan and simvastatin.



Social History:  Resides with his son.



Family History:  Noncontributory.



Review of Systems:

The patient does have some difficulty with his memory.  Diffuse weakness in the extremities, tendency
 to lose his balance, but otherwise no fevers or chills, myalgias or arthralgias, rash, headache, or 
weight change.



Physical Examination:

Vital Signs:  Blood pressure 177/88, pulse 65, respiratory rate 14, temperature 97.6, oxygen saturati
on 98%.  Weight 180 pounds, height 5 feet 8 inches, BMI 27.4. 

General:  Mr. Rodrigez is resting in bed.  He is in no significant distress. 

HEENT:  He appear disheveled with poor dentition.  Otherwise, he is normocephalic and atraumatic.  Hi
s sclerae are anicteric.  Oropharynx is moist. 

Neck:  Supple. 

Chest:  Clear. 

Heart:  Regular. 

Extremities:  Show no edema or cyanosis. 

Neurological:  He is alert and oriented to person and place.  Follows commands without difficulty.  A
lthough, at times, he may be tangential in his speech.  Cranial nerves show no focal deficits on 2 th
rough 12.  Motor exam, he has no focal deficits with normal strength in the upper and lower extremiti
es.  Coordination intact in upper and lower extremities.  Reflexes are 1+ to 2+ and symmetric in the 
upper and lower extremities.  He will be ambulated with the physical therapist.



Assessment:  Mr. Rodrigez is an 82-year-old patient, who had transient altered mental status and fell. 
 There is, at this point, no clear evidence of ongoing pathology.  There was increased creatine kinas
e likely from his fall.  He does have dementia, which is long-term and chronic and perhaps, he did no
t recall an event that led up to his falling.  At this point, he may be discharged home and follow up
 in Dr. Quan's clinic for a full dementia workup.  He should follow up with his primary care phys
ician for management of his comorbid conditions as identified including his dyslipidemia and hyperten
jay.





LB/MODL

DD:  08/15/2022 19:19:05Voice ID:  [id number]

DT:  08/15/2022 23:57:29Report ID:  [id number]

## 2022-08-17 RX ADMIN — Medication PRN MG: at 22:46

## 2022-08-17 RX ADMIN — CEFTRIAXONE SCH MLS: 1 INJECTION, POWDER, FOR SOLUTION INTRAMUSCULAR; INTRAVENOUS at 09:37

## 2022-08-17 RX ADMIN — Medication SCH ML: at 22:15

## 2022-08-17 RX ADMIN — Medication SCH ML: at 09:00

## 2022-08-17 RX ADMIN — ASPIRIN SCH MG: 81 TABLET, COATED ORAL at 09:34

## 2022-08-17 RX ADMIN — ATORVASTATIN CALCIUM SCH MG: 40 TABLET, FILM COATED ORAL at 22:45

## 2022-08-17 RX ADMIN — ACETAMINOPHEN PRN MG: 500 TABLET, FILM COATED ORAL at 22:45

## 2022-08-17 RX ADMIN — CLOPIDOGREL BISULFATE SCH MG: 75 TABLET, FILM COATED ORAL at 09:34

## 2022-08-17 RX ADMIN — CEFTRIAXONE SCH MLS: 1 INJECTION, POWDER, FOR SOLUTION INTRAMUSCULAR; INTRAVENOUS at 22:15

## 2022-08-17 RX ADMIN — ENOXAPARIN SODIUM SCH MG: 40 INJECTION SUBCUTANEOUS at 09:32

## 2022-08-17 NOTE — P.PN
Subjective


Date of Service: 08/17/22


Chief Complaint: Generalized weakness; status post fall; altered mental status;


No acute events overnight. He is alert and oriented x 1 to self. He reports no 

concerns at this time. His son, Mr. Kimble, was at bedside. We are still awaiting

approval to [facility].





Review of Systems


is unable to be obtained





Physical Examination





- Vital Signs


Temperature: 98.1 F


Blood Pressure: 145/88


Pulse: 93


Respirations: 16


Pulse Ox (%): 97





- Studies


Microbiology Data (last 24 hrs): 








08/11/22 13:40   Blood  - Blood   Aerobic Blood Culture - Final


                            No growth in 5 days.


08/11/22 13:40   Blood  - Blood   Anaerobic Blood Culture - Final


                            No growth in 5 days.


08/11/22 13:08   Blood  - Blood   Aerobic Blood Culture - Final


                            No growth in 5 days.


08/11/22 13:08   Blood  - Blood   Anaerobic Blood Culture - Final


                            No growth in 5 days.





Medications List Reviewed: Yes





Assessment And Plan





- Plan


- Physical Exam


General: Alert, In no apparent distress, Oriented x1 to self


HEENT: Atraumatic, PERRLA, Mucous membr. moist/pink, EOMI, Sclerae nonicteric


Neck: Supple, JVD not distended


Respiratory: Clear to auscultation bilaterally, Normal air movement


Cardiovascular: No edema, Regular rate/rhythm, Normal S1 S2, No gallops, No 

rubs, No murmurs


Gastrointestinal: Normal bowel sounds, Soft and benign, Non-distended, No 

tenderness, No rebound, No guarding


Musculoskeletal: No clubbing


Integumentary: No rashes


Neurological: Normal speech, Cranial nerves 3-12 intact, Normal affect











# Acute Metabolic Encephalopathy (resolved)


# Generalized Weakness complicated by Recurrent Falls


# Rhabdomyolosis


# Starvation Ketosis


# Advanced Alzheimer's Dementia


# Transient Ischemic Attack


- Statin on hold given rhabdo


- Medically cleared for discharge once accepted to a locked memory care unit


   - Appreciate case management assistance 


   - Awaiting approval to [facility] - Discussed with Mr. Kimble 

(son), who is in agreement


- Continue PT/OT


- VTE prophalyxis








Ruben Washburn M.D.








Discharge Plan: Transfer

## 2022-08-17 NOTE — P.DS
Admission Date: 08/12/22


Discharge Date: 08/17/22


Disposition: TRANSFER TO NURSING HOME


Discharge Condition: GOOD


Reason for Admission: Generalized weakness; status post fall; altered mental 

status;


Vital Signs/Physical Exam: 














Temp Pulse Resp BP Pulse Ox


 


 98.7 F   82   16   163/90 H  95 


 


 08/17/22 08:00  08/17/22 08:00  08/17/22 08:00  08/17/22 08:00  08/17/22 08:00








Laboratory Data at Discharge: 














WBC  6.0 K/uL (4.3-10.9)  D 08/14/22  06:12    


 


Hgb  12.3 g/dL (13.6-17.9)  L  08/14/22  06:12    


 


Hct  35.3 % (39.6-49.0)  L  08/14/22  06:12    


 


Plt Count  207 K/uL (152-406)   08/14/22  06:12    


 


PT  12.8 SECONDS (9.5-12.5)  H  08/11/22  12:55    


 


INR  1.16   08/11/22  12:55    


 


APTT  31.5 SECONDS (24.3-36.9)   08/11/22  12:55    


 


Sodium  138 mmol/L (136-145)   08/15/22  06:54    


 


Potassium  3.2 mmol/L (3.5-5.1)  L  08/15/22  06:54    


 


BUN  11 mg/dL (7-18)   08/15/22  06:54    


 


Creatinine  0.69 mg/dL (0.55-1.3)   08/15/22  06:54    


 


Glucose  94 mg/dL ()   08/15/22  06:54    


 


Phosphorus  2.4 mg/dL (2.5-4.9)  L  08/12/22  03:59    


 


Magnesium  1.8 mg/dL (1.8-2.4)   08/15/22  06:54    


 


Total Bilirubin  0.4 mg/dL (0.2-1.0)   08/12/22  03:59    


 


AST  70 U/L (15-37)  H  08/12/22  03:59    


 


ALT  25 U/L (12-78)   08/12/22  03:59    


 


Alkaline Phosphatase  69 U/L ()   08/12/22  03:59    


 


Triglycerides  66 mg/dL (<150)   08/12/22  03:59    


 


Cholesterol  122 mg/dL (<200)   08/12/22  03:59    


 


HDL Cholesterol  43 mg/dL (40-60)   08/12/22  03:59    


 


Cholesterol/HDL Ratio  2.84   08/12/22  03:59    








Home Medications: 








Losartan Potassium [Cozaar] 1 tab PO DAILY 08/12/22 


Aspirin [Aspirin EC 81 MG] 81 mg PO DAILY #30 08/17/22 


Atorvastatin Calcium [Lipitor] 40 mg PO BEDTIME  tab 08/17/22 


Clopidogrel Bisulfate [Plavix*] 75 mg PO DAILY 08/17/22 





New Medications: 


Aspirin [Aspirin EC 81 MG] 81 mg PO DAILY #30


Physician Discharge Instructions: 


PLEASE COMPLETE GOLD SHEET FOR STROKE/TIA


PLEASE COMPLETE PATIENT SATISFACTION FOR STROKE/TIA





1. Please schedule follow-up with your PCP in 3-5 days


Diet: Regular


Activity: Fall precautions


Followup: 


NONE,NONE [Primary Care Provider] -

## 2022-08-18 RX ADMIN — ATORVASTATIN CALCIUM SCH MG: 40 TABLET, FILM COATED ORAL at 20:53

## 2022-08-18 RX ADMIN — Medication SCH ML: at 20:53

## 2022-08-18 RX ADMIN — CEFTRIAXONE SCH MLS: 1 INJECTION, POWDER, FOR SOLUTION INTRAMUSCULAR; INTRAVENOUS at 08:27

## 2022-08-18 RX ADMIN — Medication PRN MG: at 20:53

## 2022-08-18 RX ADMIN — ASPIRIN SCH MG: 81 TABLET, COATED ORAL at 08:26

## 2022-08-18 RX ADMIN — Medication SCH ML: at 08:27

## 2022-08-18 RX ADMIN — CLOPIDOGREL BISULFATE SCH MG: 75 TABLET, FILM COATED ORAL at 08:26

## 2022-08-18 RX ADMIN — ENOXAPARIN SODIUM SCH MG: 40 INJECTION SUBCUTANEOUS at 08:28

## 2022-08-18 NOTE — P.PN
Subjective


Date of Service: 08/18/22


Chief Complaint: Generalized weakness; status post fall; altered mental status;


No acute events overnight. He is alert and oriented x 1 to self. He reports no 

concerns this morning. I have attempted to contact his son, Mr. Kimble, twice 

without success. I will be happy to speak with him if he calls back.





Review of Systems


is unable to be obtained (dementia)





Physical Examination





- Vital Signs


Temperature: 98.0 F


Blood Pressure: 128/77


Pulse: 93


Respirations: 18


Pulse Ox (%): 97





- Studies


Medications List Reviewed: Yes





Assessment And Plan





- Plan


- Physical Exam


General: Alert, In no apparent distress, Oriented x1 to self


HEENT: Atraumatic, PERRLA, Mucous membr. moist/pink, EOMI, Sclerae nonicteric


Neck: Supple, JVD not distended


Respiratory: Clear to auscultation bilaterally, Normal air movement


Cardiovascular: No edema, Regular rate/rhythm, Normal S1 S2, No gallops, No 

rubs, No murmurs


Gastrointestinal: Normal bowel sounds, Soft and benign, Non-distended, No 

tenderness, No rebound, No guarding


Musculoskeletal: No clubbing


Integumentary: No rashes


Neurological: Normal speech, Cranial nerves 3-12 intact, Normal affect











# Acute Metabolic Encephalopathy (resolved)


# Generalized Weakness complicated by Recurrent Falls


# Rhabdomyolosis


# Starvation Ketosis


# Advanced Alzheimer's Dementia


# Transient Ischemic Attack


- Statin on hold given rhabdo


- Medically cleared for discharge once accepted to a locked memory care unit


   - Appreciate case management assistance 


   - Awaiting approval to [facility] - Discussed with Mr. Kimble 

(son), who is in agreement


- Continue PT/OT


- VTE prophalyxis








- Medically stable for discharge once accepted to SNF





Ruben Washburn M.D.








Discharge Plan: Nursing Home

## 2022-08-19 RX ADMIN — Medication PRN MG: at 19:50

## 2022-08-19 RX ADMIN — ASPIRIN SCH MG: 81 TABLET, COATED ORAL at 10:12

## 2022-08-19 RX ADMIN — CLOPIDOGREL BISULFATE SCH MG: 75 TABLET, FILM COATED ORAL at 10:12

## 2022-08-19 RX ADMIN — ENOXAPARIN SODIUM SCH MG: 40 INJECTION SUBCUTANEOUS at 10:12

## 2022-08-19 RX ADMIN — Medication SCH ML: at 19:50

## 2022-08-19 RX ADMIN — ACETAMINOPHEN PRN MG: 500 TABLET, FILM COATED ORAL at 20:23

## 2022-08-19 RX ADMIN — ATORVASTATIN CALCIUM SCH MG: 40 TABLET, FILM COATED ORAL at 19:50

## 2022-08-19 RX ADMIN — Medication SCH ML: at 10:15

## 2022-08-19 NOTE — P.PN
Subjective


Date of Service: 08/19/22


Chief Complaint: Generalized weakness; status post fall; altered mental status;


No acute events overnight. He is alert and oriented x 1 to self. He reports no 

concerns this morning. Spoke with son, Mr. Kimble, who will visit [facility] today

for possible placement.





Review of Systems


is unable to be obtained





Physical Examination





- Vital Signs


Temperature: 98.3 F


Blood Pressure: 121/72


Pulse: 73


Respirations: 20


Pulse Ox (%): 97





- Studies


Medications List Reviewed: Yes





Assessment And Plan





- Plan


- Physical Exam


General: Alert, In no apparent distress, Oriented x1 to self


HEENT: Atraumatic, PERRLA, Mucous membr. moist/pink, EOMI, Sclerae nonicteric


Neck: Supple, JVD not distended


Respiratory: Clear to auscultation bilaterally, Normal air movement


Cardiovascular: No edema, Regular rate/rhythm, Normal S1 S2, No gallops, No 

rubs, No murmurs


Gastrointestinal: Normal bowel sounds, Soft and benign, Non-distended, No 

tenderness, No rebound, No guarding


Musculoskeletal: No clubbing


Integumentary: No rashes


Neurological: Normal speech, Cranial nerves 3-12 intact, Normal affect











# Acute Metabolic Encephalopathy (resolved)


# Generalized Weakness complicated by Recurrent Falls


# Rhabdomyolosis


# Starvation Ketosis


# Advanced Alzheimer's Dementia


# Transient Ischemic Attack


- Statin on hold given rhabdo


- Medically cleared for discharge once accepted to a locked memory care unit


   - Appreciate case management assistance 


   - Awaiting approval to [facility] - Discussed with Mr. Kimble 

(son), who is in agreement


- Continue PT/OT


- VTE prophalyxis








- Medically stable for discharge once accepted to SNF


- No changes today





Ruben Washburn M.D.

## 2022-08-20 RX ADMIN — Medication SCH ML: at 09:06

## 2022-08-20 RX ADMIN — Medication SCH ML: at 21:44

## 2022-08-20 RX ADMIN — Medication PRN MG: at 21:44

## 2022-08-20 RX ADMIN — ATORVASTATIN CALCIUM SCH MG: 40 TABLET, FILM COATED ORAL at 21:44

## 2022-08-20 RX ADMIN — ASPIRIN SCH MG: 81 TABLET, COATED ORAL at 09:03

## 2022-08-20 RX ADMIN — ENOXAPARIN SODIUM SCH MG: 40 INJECTION SUBCUTANEOUS at 09:04

## 2022-08-20 RX ADMIN — CLOPIDOGREL BISULFATE SCH MG: 75 TABLET, FILM COATED ORAL at 09:03

## 2022-08-20 NOTE — P.PN
Subjective


Date of Service: 08/20/22


Chief Complaint: Generalized weakness; status post fall; altered mental status;


No acute events overnight. He reports no issues or concerns this morning.





Review of Systems


is unable to be obtained





Physical Examination





- Vital Signs


Temperature: 98.5 F


Blood Pressure: 157/86


Pulse: 75


Respirations: 18


Pulse Ox (%): 98





- Studies


Medications List Reviewed: Yes





Assessment And Plan





- Plan


- Physical Exam


General: Alert, In no apparent distress, Oriented x1 to self


HEENT: Atraumatic, PERRLA, Mucous membr. moist/pink, EOMI, Sclerae nonicteric


Neck: Supple, JVD not distended


Respiratory: Clear to auscultation bilaterally, Normal air movement


Cardiovascular: No edema, Regular rate/rhythm, Normal S1 S2, No gallops, No 

rubs, No murmurs


Gastrointestinal: Normal bowel sounds, Soft and benign, Non-distended, No 

tenderness, No rebound, No guarding


Musculoskeletal: No clubbing


Integumentary: No rashes


Neurological: Normal speech, Cranial nerves 3-12 intact, Normal affect











# Acute Metabolic Encephalopathy (resolved)


# Generalized Weakness complicated by Recurrent Falls


# Rhabdomyolosis


# Starvation Ketosis


# Advanced Alzheimer's Dementia


# Transient Ischemic Attack


- Statin on hold given rhabdo


- Medically cleared for discharge once accepted to a locked memory care unit


   - Appreciate case management assistance 


   - Awaiting approval to [facility] - Discussed with Mr. Kimble 

(son), who is in agreement


- Continue PT/OT


- VTE prophalyxis








- Medically stable for discharge once accepted to SNF


- No changes today


- Anticipate discharge on Monday (08/22/2022)





Ruben Washburn M.D.

## 2022-08-21 RX ADMIN — Medication SCH: at 19:50

## 2022-08-21 RX ADMIN — CLOPIDOGREL BISULFATE SCH MG: 75 TABLET, FILM COATED ORAL at 08:17

## 2022-08-21 RX ADMIN — ATORVASTATIN CALCIUM SCH MG: 40 TABLET, FILM COATED ORAL at 19:47

## 2022-08-21 RX ADMIN — ASPIRIN SCH MG: 81 TABLET, COATED ORAL at 08:16

## 2022-08-21 RX ADMIN — Medication SCH ML: at 08:18

## 2022-08-21 RX ADMIN — ACETAMINOPHEN PRN MG: 500 TABLET, FILM COATED ORAL at 08:17

## 2022-08-21 RX ADMIN — ACETAMINOPHEN PRN MG: 500 TABLET, FILM COATED ORAL at 17:32

## 2022-08-21 RX ADMIN — ENOXAPARIN SODIUM SCH MG: 40 INJECTION SUBCUTANEOUS at 08:18

## 2022-08-21 RX ADMIN — Medication PRN MG: at 19:47

## 2022-08-21 NOTE — P.PN
Subjective


Date of Service: 08/21/22


Chief Complaint: Generalized weakness; status post fall; altered mental status;


No acute events overnight. He reports no issues or concerns this morning. He is 

in good spirits this morning.





Review of Systems


is unable to be obtained





Physical Examination





- Vital Signs


Temperature: 97.7 F


Blood Pressure: 142/92


Pulse: 76


Respirations: 22


Pulse Ox (%): 97





- Studies


Medications List Reviewed: Yes





Assessment And Plan





- Plan


- Physical Exam


General: Alert, In no apparent distress, Oriented x1 to self


HEENT: Atraumatic, PERRLA, Mucous membr. moist/pink, EOMI, Sclerae nonicteric


Neck: Supple, JVD not distended


Respiratory: Clear to auscultation bilaterally, Normal air movement


Cardiovascular: No edema, Regular rate/rhythm, Normal S1 S2, No gallops, No 

rubs, No murmurs


Gastrointestinal: Normal bowel sounds, Soft and benign, Non-distended, No 

tenderness, No rebound, No guarding


Musculoskeletal: No clubbing


Integumentary: No rashes


Neurological: Normal speech, Cranial nerves 3-12 intact, Normal affect











# Acute Metabolic Encephalopathy (resolved)


# Generalized Weakness complicated by Recurrent Falls


# Rhabdomyolosis


# Starvation Ketosis


# Advanced Alzheimer's Dementia


# Transient Ischemic Attack


- Statin on hold given rhabdo


- Medically cleared for discharge once accepted to a locked memory care unit


   - Appreciate case management assistance 


   - Awaiting approval to [facility] - Discussed with Mr. Kimble 

(son), who is in agreement


- Continue PT/OT


- VTE prophalyxis








- Medically stable for discharge once accepted to SNF


- No changes today


- Anticipate discharge tomorrow (08/22/2022)





Ruben Washburn M.D.

## 2022-08-22 RX ADMIN — ASPIRIN SCH MG: 81 TABLET, COATED ORAL at 08:39

## 2022-08-22 RX ADMIN — ENOXAPARIN SODIUM SCH MG: 40 INJECTION SUBCUTANEOUS at 08:39

## 2022-08-22 RX ADMIN — Medication SCH: at 08:39

## 2022-08-22 RX ADMIN — CLOPIDOGREL BISULFATE SCH MG: 75 TABLET, FILM COATED ORAL at 08:39

## 2022-08-22 RX ADMIN — POTASSIUM CHLORIDE ONE: 10 TABLET, FILM COATED, EXTENDED RELEASE ORAL at 19:04

## 2022-08-22 RX ADMIN — Medication PRN MG: at 20:14

## 2022-08-22 RX ADMIN — ATORVASTATIN CALCIUM SCH MG: 40 TABLET, FILM COATED ORAL at 20:14

## 2022-08-22 RX ADMIN — Medication SCH ML: at 20:20

## 2022-08-22 RX ADMIN — POTASSIUM CHLORIDE ONE MEQ: 10 TABLET, FILM COATED, EXTENDED RELEASE ORAL at 20:14

## 2022-08-22 NOTE — RAD REPORT
EXAM DESCRIPTION:  [facility] Single View8/22/2022 1:45 pm

 

CLINICAL HISTORY:  Rule out TB

 

COMPARISON:  August 11, 2022

 

FINDINGS:  A 12 millimeter nodular opacity mid right lung.

 

Additional equivocal small right upper lobe opacity.

 

Left lung appears clear of acute infiltrate. The heart is normal size

 

IMPRESSION:  12 millimeter nodular opacity mid right lung with an additional equivocal small right up
per lobe opacity. CT chest is recommended

## 2022-08-22 NOTE — P.PN
Date of Service: 08/22/22





Subjective


Patient is clinically doing well.  Patient denies any new complaints.  Awaiting 

for placement





Review of Systems


10-point ROS is otherwise unremarkable





Physical Examination





- Vital Signs


Reviewed





- Physical Exam


General: Alert, In no apparent distress, Demented


Respiratory: Clear to auscultation bilaterally, Normal air movement


Cardiovascular: Regular rate/rhythm, Normal S1 S2


Gastrointestinal: Normal bowel sounds, No tenderness


Neurological: Normal speech, Normal tone, Normal affect





Assessment & Plan





- Problems (Diagnosis)


(1) Altered mental status


Current Visit: Yes   Status: Acute   





(2) Fever


Current Visit: Yes   Status: Acute   





(3) Status post fall


Current Visit: Yes   Status: Acute   





(4) Generalized weakness


Current Visit: Yes   Status: Acute   





(5) Myopathy


Current Visit: Yes   Status: Acute   





(6) Alzheimer's dementia


Current Visit: Yes   Status: Acute   





(7) TIA (transient ischemic attack)


Current Visit: Yes   Status: Acute   





(8) Hypoxemia


Current Visit: Yes   Status: Acute   





- Plan


Continue with plan of care as mentioned below:


1.  Awaiting placement at this time


2.  Continue with physical therapy


3.  O2 per protocol


4.  Anticipate DC in the am


5.  DVT prophylaxis


6. GI and DVT prophylaxis





Discharge Plan: Nursing Home


Plan to discharge in: 48 Hours





- Advance Directives


Does patient have a Living Will: No


Does patient have a Durable POA for Healthcare: No





- Code Status/Comfort Care


Code Status: Full Code


Critical Care: No


Time Spent Managing PTS Care (In Minutes): 45

## 2022-08-22 NOTE — RAD REPORT
EXAM DESCRIPTION:  CT - Thorax W/ Con - 8/22/2022 4:19 pm

 

CLINICAL HISTORY:  Pulmonary nodule

 

COMPARISON:  Chest x-ray August 22, 2022

 

TECHNIQUE:  Computed axial tomography of the chest was obtained. 100 cc Isovue 300 was administered i
ntravenously.

 

All CT scans are performed using dose optimization technique as appropriate and may include automated
 exposure control or mA/KV adjustment according to patient size.

 

FINDINGS:  A small to moderate patchy opacity posterior right upper lobe.

 

12 millimeter spiculated nodule superior right lower lobe.

 

Mild patchy opacities left lower lobe

 

No mediastinal or hilar lymphadenopathy is seen.

 

A pleural effusion is not present. A pericardial effusion is not seen.

 

Sinus of Valsalva measures 4.5 centimeters

 

IMPRESSION:  Bilateral patchy lung opacities. This is not a classic presentation for TB however this 
cannot be entirely excluded. Other causes of pneumonia are considered more likely.

 

12 millimeter spiculated nodule right lower lobe could be infectious or neoplastic.

 

PET-CT scan may be helpful for further evaluation

 

Sinus of Valsalva measures 4.5 centimeters which is borderline aneurysm

## 2022-08-23 VITALS — OXYGEN SATURATION: 95 %

## 2022-08-23 LAB
BUN BLD-MCNC: 16 MG/DL (ref 7–18)
GLUCOSE SERPLBLD-MCNC: 101 MG/DL (ref 74–106)
POTASSIUM SERPL-SCNC: 4 MMOL/L (ref 3.5–5.1)

## 2022-08-23 RX ADMIN — ENOXAPARIN SODIUM SCH MG: 40 INJECTION SUBCUTANEOUS at 07:59

## 2022-08-23 RX ADMIN — ATORVASTATIN CALCIUM SCH MG: 40 TABLET, FILM COATED ORAL at 20:06

## 2022-08-23 RX ADMIN — Medication SCH: at 07:59

## 2022-08-23 RX ADMIN — ASPIRIN SCH MG: 81 TABLET, COATED ORAL at 07:59

## 2022-08-23 RX ADMIN — Medication SCH: at 21:00

## 2022-08-23 RX ADMIN — Medication PRN MG: at 20:06

## 2022-08-23 RX ADMIN — CLOPIDOGREL BISULFATE SCH MG: 75 TABLET, FILM COATED ORAL at 07:59

## 2022-08-23 NOTE — P.PN
Date of Service: 08/23/22





Subjective


Patient is clinically doing well.  Patient denies any new complaints.  Awaiting 

for placement





Review of Systems


10-point ROS is otherwise unremarkable





Physical Examination





- Vital Signs


Reviewed





- Physical Exam


General: Alert, In no apparent distress, Demented


Respiratory: Clear to auscultation bilaterally, Normal air movement


Cardiovascular: Regular rate/rhythm, Normal S1 S2


Gastrointestinal: Normal bowel sounds, No tenderness


Neurological: Normal speech, Normal tone, Normal affect





Assessment & Plan





- Problems (Diagnosis)


(1) Altered mental status


Current Visit: Yes   Status: Acute   





(2) Fever


Current Visit: Yes   Status: Acute   





(3) Status post fall


Current Visit: Yes   Status: Acute   





(4) Generalized weakness


Current Visit: Yes   Status: Acute   





(5) Myopathy


Current Visit: Yes   Status: Acute   





(6) Alzheimer's dementia


Current Visit: Yes   Status: Acute   





(7) TIA (transient ischemic attack)


Current Visit: Yes   Status: Acute   





(8) Hypoxemia


Current Visit: Yes   Status: Acute   





- Plan


Continue with plan of care as mentioned below:


1.  Awaiting placement at this time


2.  Continue with physical therapy


3.  O2 per protocol


4.  Anticipate DC in the am


5.  DVT prophylaxis


6.  GI and DVT prophylaxis

## 2022-08-24 VITALS — DIASTOLIC BLOOD PRESSURE: 91 MMHG | TEMPERATURE: 97.6 F | SYSTOLIC BLOOD PRESSURE: 149 MMHG

## 2022-08-24 RX ADMIN — ENOXAPARIN SODIUM SCH MG: 40 INJECTION SUBCUTANEOUS at 08:55

## 2022-08-24 RX ADMIN — ASPIRIN SCH MG: 81 TABLET, COATED ORAL at 08:55

## 2022-08-24 RX ADMIN — Medication SCH ML: at 08:56

## 2022-08-24 RX ADMIN — CLOPIDOGREL BISULFATE SCH MG: 75 TABLET, FILM COATED ORAL at 08:55

## 2022-08-24 NOTE — P.PN
Date of Service: 08/24/22





Subjective


Spoke to nursing staff yesterday that patient with no signs and symptoms of TB 

and has no reason to be moved to isolation. The only reason radiology mentioned 

it was that we were trying to rule it out and he mentioned that CT and X-ray 

findings could not rule it out. However, no indication of clinically having TB. 

Patient does not need isolation. However, patient was still placed in isolation.

## 2022-08-24 NOTE — P.DS
Discharge Date: 08/24/22


Disposition: TRANSFER TO NURSING HOME


Discharge Condition: GOOD


Reason for Admission: Generalized weakness; status post fall; altered mental 

status;





- Problems


(1) Altered mental status


Current Visit: Yes   Status: Acute   





(2) Fever


Current Visit: Yes   Status: Acute   





(3) Status post fall


Current Visit: Yes   Status: Acute   





(4) Generalized weakness


Current Visit: Yes   Status: Acute   





(5) Myopathy


Current Visit: Yes   Status: Acute   





(6) Alzheimer's dementia


Current Visit: Yes   Status: Acute   





(7) TIA (transient ischemic attack)


Current Visit: Yes   Status: Acute   





(8) Hypoxemia


Current Visit: Yes   Status: Acute   


Brief History of Present Illness: 





Patient is an 82-year-old gentleman came to the hospital after falling.  Patient

apparently has been weak and not really ambulating well.  Patient has a history 

of dementia but he is able to live by himself with assistance of his son.  He 

does his own ADLs.  He is forgetful and he has been diagnosed with dementia.  

But as of late he has been falling and been really weak.  He was found on the 

floor and his son brought him into the emergency room.  In the emergency room 

patient is confused and lethargic and not making much sense.  There is concern 

he has had a CVA.  CT imaging is negative.  Were not really sure how long he was

down so he is out of the window for tPA.  He will be admitted to the hospital 

for further evaluation.  Patient's son also states has been coughing and 

congested. He has not had any fevers.  He has not had any fevers.  He will be 

admitted to the hospital for further evaluation.


Hospital Course: 





Had a prolonged hospitalization.  However we were able to get patient 

transferred to a memory care unit.  Overall patient is clinically doing well.  

Patient is medically stable for discharge at this time.


Vital Signs/Physical Exam: 














Temp Pulse Resp BP Pulse Ox


 


 97.6 F   74   18   149/91 H  97 


 


 08/24/22 08:00  08/24/22 08:00  08/24/22 08:00  08/24/22 08:00  08/24/22 08:00








General: Alert, In no apparent distress, Oriented x3


Laboratory Data at Discharge: 














WBC  6.0 K/uL (4.3-10.9)  D 08/14/22  06:12    


 


Hgb  12.3 g/dL (13.6-17.9)  L  08/14/22  06:12    


 


Hct  35.3 % (39.6-49.0)  L  08/14/22  06:12    


 


Plt Count  207 K/uL (152-406)   08/14/22  06:12    


 


PT  12.8 SECONDS (9.5-12.5)  H  08/11/22  12:55    


 


INR  1.16   08/11/22  12:55    


 


APTT  31.5 SECONDS (24.3-36.9)   08/11/22  12:55    


 


Sodium  135 mmol/L (136-145)  L  08/23/22  05:38    


 


Potassium  4.0 mmol/L (3.5-5.1)   08/23/22  05:38    


 


BUN  16 mg/dL (7-18)   08/23/22  05:38    


 


Creatinine  0.81 mg/dL (0.55-1.3)   08/23/22  05:38    


 


Glucose  101 mg/dL ()   08/23/22  05:38    


 


Phosphorus  2.4 mg/dL (2.5-4.9)  L  08/12/22  03:59    


 


Magnesium  1.8 mg/dL (1.8-2.4)   08/15/22  06:54    


 


Total Bilirubin  0.4 mg/dL (0.2-1.0)   08/12/22  03:59    


 


AST  70 U/L (15-37)  H  08/12/22  03:59    


 


ALT  25 U/L (12-78)   08/12/22  03:59    


 


Alkaline Phosphatase  69 U/L ()   08/12/22  03:59    


 


Triglycerides  66 mg/dL (<150)   08/12/22  03:59    


 


Cholesterol  122 mg/dL (<200)   08/12/22  03:59    


 


HDL Cholesterol  43 mg/dL (40-60)   08/12/22  03:59    


 


Cholesterol/HDL Ratio  2.84   08/12/22  03:59    








Home Medications: 








Losartan Potassium [Cozaar] 1 tab PO DAILY 08/12/22 


Aspirin [Aspirin EC 81 MG] 81 mg PO DAILY #30 08/17/22 


Atorvastatin Calcium [Lipitor] 40 mg PO BEDTIME  tab 08/17/22 


Clopidogrel Bisulfate [Plavix*] 75 mg PO DAILY 08/17/22 





New Medications: 


Aspirin [Aspirin EC 81 MG] 81 mg PO DAILY #30


Physician Discharge Instructions: 


PLEASE COMPLETE GOLD SHEET FOR STROKE/TIA


PLEASE COMPLETE PATIENT SATISFACTION FOR STROKE/TIA


DISCHARGE TO DEMENTIA UNIT


Diet: Regular


Activity: Fall precautions


Followup: 


NONE,NONE [Primary Care Provider] - 


Time spent managing pt's care (in minutes): 35